# Patient Record
Sex: MALE | Race: WHITE
[De-identification: names, ages, dates, MRNs, and addresses within clinical notes are randomized per-mention and may not be internally consistent; named-entity substitution may affect disease eponyms.]

---

## 2019-09-23 ENCOUNTER — HOSPITAL ENCOUNTER (EMERGENCY)
Dept: HOSPITAL 19 - COL.ER | Age: 19
Discharge: ANOTHER HOSPITAL | End: 2019-09-23
Payer: COMMERCIAL

## 2019-09-23 VITALS — WEIGHT: 160.28 LBS | TEMPERATURE: 97 F | BODY MASS INDEX: 20.57 KG/M2 | HEIGHT: 74.02 IN

## 2019-09-23 VITALS — HEART RATE: 127 BPM | DIASTOLIC BLOOD PRESSURE: 88 MMHG | SYSTOLIC BLOOD PRESSURE: 127 MMHG
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
## 2019-09-23 DIAGNOSIS — I95.9: ICD-10-CM

## 2019-09-23 DIAGNOSIS — E10.65: ICD-10-CM

## 2019-09-23 DIAGNOSIS — S11.91XA: Primary | ICD-10-CM

## 2019-09-23 LAB
ALBUMIN SERPL-MCNC: 3.6 GM/DL (ref 3.5–5)
ALP SERPL-CCNC: 54 U/L (ref 50–136)
ALT SERPL-CCNC: 17 U/L (ref 21–72)
ANION GAP SERPL CALC-SCNC: 17 MMOL/L (ref 7–16)
APAP SERPL-MCNC: < 10 UG/ML (ref 10–30)
AST SERPL-CCNC: 25 U/L (ref 15–37)
BASE EXCESS BLDA CALC-SCNC: -12 MMOL/L (ref -2–2)
BASOPHILS # BLD: 0.1 10*3/UL (ref 0–0.2)
BASOPHILS NFR BLD AUTO: 0.5 % (ref 0–2)
BILIRUB SERPL-MCNC: 0.6 MG/DL (ref 0–1)
BUN SERPL-MCNC: 16 MG/DL (ref 9–20)
CALCIUM SERPL-MCNC: 9.1 MG/DL (ref 8.4–10.2)
CHLORIDE SERPL-SCNC: 101 MMOL/L (ref 98–107)
CO2 BLDA-SCNC: 15.4 MMOL/L
CO2 SERPL-SCNC: 17 MMOL/L (ref 22–30)
CREAT SERPL-SCNC: 1.38 UMOL/L (ref 0.66–1.25)
EOSINOPHIL # BLD: 0.1 10*3/UL (ref 0–0.7)
EOSINOPHIL NFR BLD: 0.3 % (ref 0–4)
ERYTHROCYTE [DISTWIDTH] IN BLOOD BY AUTOMATED COUNT: 12.4 % (ref 11.5–14.5)
GLUCOSE SERPL-MCNC: 492 MG/DL (ref 74–106)
GRANULOCYTES # BLD AUTO: 75.7 % (ref 42.2–75.2)
HCO3 BLDA-SCNC: 14.3 MEQ/L (ref 22–26)
HCT VFR BLD AUTO: 35.7 % (ref 36–47)
HGB BLD-MCNC: 11.6 G/DL (ref 12.5–16.1)
INHALED O2 CONCENTRATION: 50 %
LYMPHOCYTES # BLD: 2.8 10*3/UL (ref 1.2–3.4)
LYMPHOCYTES NFR BLD: 18.7 % (ref 20–51)
MCH RBC QN AUTO: 30 PG (ref 26–32)
MCHC RBC AUTO-ENTMCNC: 33 G/DL (ref 33–37)
MCV RBC AUTO: 91 FL (ref 80–95)
MONOCYTES # BLD: 0.6 10*3/UL (ref 0.1–0.6)
MONOCYTES NFR BLD AUTO: 3.8 % (ref 1.7–9.3)
NEUTROPHILS # BLD: 11.4 10*3/UL (ref 1.4–6.5)
PCO2 BLDA: 34.2 MMHG (ref 35–45)
PLATELET # BLD AUTO: 493 K/MM3 (ref 130–400)
PMV BLD AUTO: 10.6 FL (ref 7.4–10.4)
PO2 BLDA: 237.8 MMHG (ref 80–100)
POTASSIUM SERPL-SCNC: 4.6 MMOL/L (ref 3.4–5)
PROT SERPL-MCNC: 5.9 GM/DL (ref 6.4–8.2)
RBC # BLD AUTO: 3.92 M/MM3 (ref 4.2–5.6)
SALICYLATES SERPL-MCNC: < 1 MG/DL
SAO2 % BLDA: 99.3 % (ref 92–100)
SODIUM SERPL-SCNC: 135 MMOL/L (ref 137–145)

## 2019-09-23 PROCEDURE — P9016 RBC LEUKOCYTES REDUCED: HCPCS

## 2019-09-23 NOTE — NUR
MSW student responded to the emergency department for a 
referral.  The patient's brother, Les was in the ED family room with a
friend. The patient is being transferred via [facility] to [facility] in
[place].  Les contacted the patient's mother, father, and brother. They are
in the [facility] [place] and are enroute to [place]. There are no additional needs
at this time.